# Patient Record
Sex: MALE | HISPANIC OR LATINO | Employment: FULL TIME | URBAN - METROPOLITAN AREA
[De-identification: names, ages, dates, MRNs, and addresses within clinical notes are randomized per-mention and may not be internally consistent; named-entity substitution may affect disease eponyms.]

---

## 2017-09-29 ENCOUNTER — OFFICE VISIT (OUTPATIENT)
Dept: MEDICAL GROUP | Facility: PHYSICIAN GROUP | Age: 22
End: 2017-09-29
Payer: COMMERCIAL

## 2017-09-29 VITALS
OXYGEN SATURATION: 100 % | RESPIRATION RATE: 14 BRPM | SYSTOLIC BLOOD PRESSURE: 140 MMHG | BODY MASS INDEX: 23.58 KG/M2 | HEIGHT: 68 IN | TEMPERATURE: 99.3 F | HEART RATE: 92 BPM | DIASTOLIC BLOOD PRESSURE: 82 MMHG | WEIGHT: 155.6 LBS

## 2017-09-29 DIAGNOSIS — Z13.83 SCREENING FOR CARDIOVASCULAR, RESPIRATORY, AND GENITOURINARY DISEASES: ICD-10-CM

## 2017-09-29 DIAGNOSIS — Z00.00 HEALTHY ADULT ON ROUTINE PHYSICAL EXAMINATION: ICD-10-CM

## 2017-09-29 DIAGNOSIS — Z13.89 SCREENING FOR CARDIOVASCULAR, RESPIRATORY, AND GENITOURINARY DISEASES: ICD-10-CM

## 2017-09-29 DIAGNOSIS — Z13.6 SCREENING FOR CARDIOVASCULAR, RESPIRATORY, AND GENITOURINARY DISEASES: ICD-10-CM

## 2017-09-29 DIAGNOSIS — F17.200 CURRENT SMOKER: ICD-10-CM

## 2017-09-29 PROCEDURE — 99385 PREV VISIT NEW AGE 18-39: CPT | Performed by: NURSE PRACTITIONER

## 2017-09-29 ASSESSMENT — PATIENT HEALTH QUESTIONNAIRE - PHQ9: CLINICAL INTERPRETATION OF PHQ2 SCORE: 0

## 2017-09-30 NOTE — PROGRESS NOTES
Chief Complaint   Patient presents with   • Establish Care       HPI:    Corbin Quintanilla is a 22 y.o. male here to establish care and to discuss the following:    Healthy adult on routine physical examination  Patient is here to establish care and currently has no medical concerns.    Current smoker  violeta smokes approximately 3 cigarettes a day      Current medicines (including changes today)  No current outpatient prescriptions on file.     No current facility-administered medications for this visit.      He  has no past medical history on file.  He  has no past surgical history on file.  Social History   Substance Use Topics   • Smoking status: Current Every Day Smoker     Packs/day: 0.25   • Smokeless tobacco: Never Used   • Alcohol use No     Social History     Social History Narrative   • No narrative on file     Family History   Problem Relation Age of Onset   • Hypertension Mother    • Diabetes Mother    • Diabetes Father    • Hypertension Sister    • Hypertension Brother    • Cancer Maternal Grandmother      lung   • Hypertension Sister      Family Status   Relation Status   • Mother Alive   • Father Alive   • Sister Alive   • Brother Alive   • Maternal Grandmother    • Maternal Grandfather    • Paternal Grandmother    • Paternal Grandfather Alive   • Sister          ROS    Review of Systems   Constitutional: Negative for fever, chills, weight loss and malaise/fatigue.   HENT: Negative for ear pain, nosebleeds, congestion, sore throat and neck pain.    Eyes: Negative for blurred vision.   Respiratory: Negative for cough, sputum production, shortness of breath and wheezing.    Cardiovascular: Negative for chest pain, palpitations,  and leg swelling.   Gastrointestinal: Negative for heartburn, nausea, vomiting, diarrhea and abdominal pain.   Genitourinary: Negative for dysuria, urgency and frequency.   Musculoskeletal: Negative for myalgias, back pain and joint pain.   Skin:  "Negative for rash and itching.   Neurological: Negative for dizziness, tingling, tremors, sensory change, focal weakness and headaches.   Endo/Heme/Allergies: Does not bruise/bleed easily.   Psychiatric/Behavioral: Negative for depression, anxiety, suicidal ideas, insomnia and memory loss.     All other systems reviewed and are negative except as in HPI.     Objective:     Blood pressure 140/82, pulse 92, temperature 37.4 °C (99.3 °F), resp. rate 14, height 1.727 m (5' 8\"), weight 70.6 kg (155 lb 9.6 oz), SpO2 100 %. Body mass index is 23.66 kg/m².  Physical Exam:  Constitutional: Alert, no distress.  Skin: Warm, dry, good turgor, no rashes in visible areas.  Eye: Equal, round and reactive, conjunctiva clear, lids normal.  ENMT: Lips without lesions, good dentition, oropharynx clear. Ear canals are clear, TMs within normal limits bilaterally.   Neck: Trachea midline, no masses, no thyromegaly. No cervical or supraclavicular lymphadenopathy.  Respiratory: Unlabored respiratory effort, lungs clear to auscultation, no wheezes, no ronchi.  Cardiovascular: Normal S1, S2, no murmur, no edema.  Abdomen: Soft, non-tender, no masses, no hepatosplenomegaly.  Psych: Alert and oriented x3, normal affect and mood.  MS: Ambulates independently with steady gait. Has full range of motion of all extremities and spine.  Neuro: Cranial nerves intact. DTRs within normal limits. No neurological deficits.        Assessment and Plan:   The following treatment plan was discussed   1. Healthy adult on routine physical examination     2. Screening for cardiovascular, respiratory, and genitourinary diseases  COMP METABOLIC PANEL    LIPID PROFILE    labs ordered   3. Current smoker      discussed smoking cessation   no previous PCP  Followup: Return in about 1 year (around 9/29/2018) for AWV/HRA.   Please note that this dictation was created using voice recognition software. I have made every reasonable attempt to correct obvious errors, but I " expect that there are errors of grammar and possibly content that I did not discover before finalizing the note.

## 2017-10-05 PROBLEM — F17.200 CURRENT SMOKER: Status: ACTIVE | Noted: 2017-10-05

## 2018-10-19 ENCOUNTER — APPOINTMENT (OUTPATIENT)
Dept: RADIOLOGY | Facility: MEDICAL CENTER | Age: 23
End: 2018-10-19
Attending: EMERGENCY MEDICINE
Payer: COMMERCIAL

## 2018-10-19 ENCOUNTER — HOSPITAL ENCOUNTER (OUTPATIENT)
Facility: MEDICAL CENTER | Age: 23
End: 2018-10-23
Attending: EMERGENCY MEDICINE | Admitting: SURGERY
Payer: COMMERCIAL

## 2018-10-19 DIAGNOSIS — G89.18 POST-OP PAIN: ICD-10-CM

## 2018-10-19 LAB
ALBUMIN SERPL BCP-MCNC: 4.7 G/DL (ref 3.2–4.9)
ALBUMIN/GLOB SERPL: 1.7 G/DL
ALP SERPL-CCNC: 58 U/L (ref 30–99)
ALT SERPL-CCNC: 15 U/L (ref 2–50)
ANION GAP SERPL CALC-SCNC: 9 MMOL/L (ref 0–11.9)
AST SERPL-CCNC: 19 U/L (ref 12–45)
BASOPHILS # BLD AUTO: 0.9 % (ref 0–1.8)
BASOPHILS # BLD: 0.1 K/UL (ref 0–0.12)
BILIRUB SERPL-MCNC: 0.8 MG/DL (ref 0.1–1.5)
BUN SERPL-MCNC: 17 MG/DL (ref 8–22)
CALCIUM SERPL-MCNC: 9.9 MG/DL (ref 8.5–10.5)
CHLORIDE SERPL-SCNC: 106 MMOL/L (ref 96–112)
CO2 SERPL-SCNC: 24 MMOL/L (ref 20–33)
CREAT SERPL-MCNC: 0.77 MG/DL (ref 0.5–1.4)
EKG IMPRESSION: NORMAL
EOSINOPHIL # BLD AUTO: 0.31 K/UL (ref 0–0.51)
EOSINOPHIL NFR BLD: 2.8 % (ref 0–6.9)
ERYTHROCYTE [DISTWIDTH] IN BLOOD BY AUTOMATED COUNT: 39.1 FL (ref 35.9–50)
GLOBULIN SER CALC-MCNC: 2.7 G/DL (ref 1.9–3.5)
GLUCOSE SERPL-MCNC: 122 MG/DL (ref 65–99)
HCT VFR BLD AUTO: 49.1 % (ref 42–52)
HGB BLD-MCNC: 16.5 G/DL (ref 14–18)
IMM GRANULOCYTES # BLD AUTO: 0.05 K/UL (ref 0–0.11)
IMM GRANULOCYTES NFR BLD AUTO: 0.5 % (ref 0–0.9)
LIPASE SERPL-CCNC: 28 U/L (ref 11–82)
LYMPHOCYTES # BLD AUTO: 3.97 K/UL (ref 1–4.8)
LYMPHOCYTES NFR BLD: 36.1 % (ref 22–41)
MCH RBC QN AUTO: 28.6 PG (ref 27–33)
MCHC RBC AUTO-ENTMCNC: 33.6 G/DL (ref 33.7–35.3)
MCV RBC AUTO: 85.2 FL (ref 81.4–97.8)
MONOCYTES # BLD AUTO: 0.71 K/UL (ref 0–0.85)
MONOCYTES NFR BLD AUTO: 6.4 % (ref 0–13.4)
NEUTROPHILS # BLD AUTO: 5.87 K/UL (ref 1.82–7.42)
NEUTROPHILS NFR BLD: 53.3 % (ref 44–72)
NRBC # BLD AUTO: 0 K/UL
NRBC BLD-RTO: 0 /100 WBC
PLATELET # BLD AUTO: 247 K/UL (ref 164–446)
PMV BLD AUTO: 9.9 FL (ref 9–12.9)
POTASSIUM SERPL-SCNC: 3.7 MMOL/L (ref 3.6–5.5)
PROT SERPL-MCNC: 7.4 G/DL (ref 6–8.2)
RBC # BLD AUTO: 5.76 M/UL (ref 4.7–6.1)
SODIUM SERPL-SCNC: 139 MMOL/L (ref 135–145)
TROPONIN I SERPL-MCNC: <0.01 NG/ML (ref 0–0.04)
WBC # BLD AUTO: 11 K/UL (ref 4.8–10.8)

## 2018-10-19 PROCEDURE — 93005 ELECTROCARDIOGRAM TRACING: CPT | Performed by: EMERGENCY MEDICINE

## 2018-10-19 PROCEDURE — 36415 COLL VENOUS BLD VENIPUNCTURE: CPT

## 2018-10-19 PROCEDURE — 700105 HCHG RX REV CODE 258: Performed by: EMERGENCY MEDICINE

## 2018-10-19 PROCEDURE — 700102 HCHG RX REV CODE 250 W/ 637 OVERRIDE(OP): Performed by: EMERGENCY MEDICINE

## 2018-10-19 PROCEDURE — 76705 ECHO EXAM OF ABDOMEN: CPT

## 2018-10-19 PROCEDURE — 83690 ASSAY OF LIPASE: CPT

## 2018-10-19 PROCEDURE — 700111 HCHG RX REV CODE 636 W/ 250 OVERRIDE (IP): Performed by: EMERGENCY MEDICINE

## 2018-10-19 PROCEDURE — 84484 ASSAY OF TROPONIN QUANT: CPT

## 2018-10-19 PROCEDURE — 96365 THER/PROPH/DIAG IV INF INIT: CPT

## 2018-10-19 PROCEDURE — 93005 ELECTROCARDIOGRAM TRACING: CPT

## 2018-10-19 PROCEDURE — 99285 EMERGENCY DEPT VISIT HI MDM: CPT

## 2018-10-19 PROCEDURE — 85025 COMPLETE CBC W/AUTO DIFF WBC: CPT

## 2018-10-19 PROCEDURE — G0378 HOSPITAL OBSERVATION PER HR: HCPCS

## 2018-10-19 PROCEDURE — 80053 COMPREHEN METABOLIC PANEL: CPT

## 2018-10-19 PROCEDURE — 74177 CT ABD & PELVIS W/CONTRAST: CPT

## 2018-10-19 PROCEDURE — 700117 HCHG RX CONTRAST REV CODE 255: Performed by: EMERGENCY MEDICINE

## 2018-10-19 PROCEDURE — A9270 NON-COVERED ITEM OR SERVICE: HCPCS | Performed by: EMERGENCY MEDICINE

## 2018-10-19 PROCEDURE — 96375 TX/PRO/DX INJ NEW DRUG ADDON: CPT

## 2018-10-19 RX ORDER — ONDANSETRON 2 MG/ML
4 INJECTION INTRAMUSCULAR; INTRAVENOUS EVERY 6 HOURS PRN
Status: DISCONTINUED | OUTPATIENT
Start: 2018-10-19 | End: 2018-10-20

## 2018-10-19 RX ORDER — SODIUM CHLORIDE, SODIUM LACTATE, POTASSIUM CHLORIDE, CALCIUM CHLORIDE 600; 310; 30; 20 MG/100ML; MG/100ML; MG/100ML; MG/100ML
30 INJECTION, SOLUTION INTRAVENOUS ONCE
Status: COMPLETED | OUTPATIENT
Start: 2018-10-19 | End: 2018-10-20

## 2018-10-19 RX ORDER — SODIUM CHLORIDE, SODIUM LACTATE, POTASSIUM CHLORIDE, CALCIUM CHLORIDE 600; 310; 30; 20 MG/100ML; MG/100ML; MG/100ML; MG/100ML
INJECTION, SOLUTION INTRAVENOUS CONTINUOUS
Status: DISCONTINUED | OUTPATIENT
Start: 2018-10-19 | End: 2018-10-19

## 2018-10-19 RX ORDER — DEXTROSE MONOHYDRATE, SODIUM CHLORIDE, AND POTASSIUM CHLORIDE 50; 2.98; 4.5 G/1000ML; G/1000ML; G/1000ML
INJECTION, SOLUTION INTRAVENOUS CONTINUOUS
Status: DISCONTINUED | OUTPATIENT
Start: 2018-10-19 | End: 2018-10-20

## 2018-10-19 RX ORDER — MORPHINE SULFATE 4 MG/ML
4 INJECTION, SOLUTION INTRAMUSCULAR; INTRAVENOUS ONCE
Status: COMPLETED | OUTPATIENT
Start: 2018-10-19 | End: 2018-10-19

## 2018-10-19 RX ORDER — MORPHINE SULFATE 4 MG/ML
4 INJECTION, SOLUTION INTRAMUSCULAR; INTRAVENOUS
Status: DISCONTINUED | OUTPATIENT
Start: 2018-10-19 | End: 2018-10-20

## 2018-10-19 RX ORDER — ONDANSETRON 2 MG/ML
4 INJECTION INTRAMUSCULAR; INTRAVENOUS ONCE
Status: COMPLETED | OUTPATIENT
Start: 2018-10-19 | End: 2018-10-19

## 2018-10-19 RX ADMIN — ONDANSETRON 4 MG: 2 INJECTION INTRAMUSCULAR; INTRAVENOUS at 21:06

## 2018-10-19 RX ADMIN — PIPERACILLIN AND TAZOBACTAM 4.5 G: 4; .5 INJECTION, POWDER, LYOPHILIZED, FOR SOLUTION INTRAVENOUS; PARENTERAL at 23:30

## 2018-10-19 RX ADMIN — LIDOCAINE HYDROCHLORIDE 30 ML: 20 SOLUTION OROPHARYNGEAL at 20:51

## 2018-10-19 RX ADMIN — FENTANYL CITRATE 100 MCG: 50 INJECTION, SOLUTION INTRAMUSCULAR; INTRAVENOUS at 21:51

## 2018-10-19 RX ADMIN — IOHEXOL 80 ML: 350 INJECTION, SOLUTION INTRAVENOUS at 22:30

## 2018-10-19 RX ADMIN — MORPHINE SULFATE 4 MG: 4 INJECTION INTRAVENOUS at 21:05

## 2018-10-19 ASSESSMENT — PAIN DESCRIPTION - DESCRIPTORS: DESCRIPTORS: SHARP

## 2018-10-19 ASSESSMENT — PAIN SCALES - GENERAL
PAINLEVEL_OUTOF10: 10
PAINLEVEL_OUTOF10: 10

## 2018-10-20 ENCOUNTER — APPOINTMENT (OUTPATIENT)
Dept: RADIOLOGY | Facility: MEDICAL CENTER | Age: 23
End: 2018-10-20
Attending: SURGERY
Payer: COMMERCIAL

## 2018-10-20 LAB
APTT PPP: 30.1 SEC (ref 24.7–36)
HBV SURFACE AG SER QL: NEGATIVE
HCV AB SER QL: NEGATIVE
HIV 1+2 AB+HIV1 P24 AG SERPL QL IA: NON REACTIVE
INR PPP: 1.28 (ref 0.87–1.13)
LACTATE BLD-SCNC: 1.2 MMOL/L (ref 0.5–2)
PROTHROMBIN TIME: 16.1 SEC (ref 12–14.6)

## 2018-10-20 PROCEDURE — C9113 INJ PANTOPRAZOLE SODIUM, VIA: HCPCS | Performed by: SURGERY

## 2018-10-20 PROCEDURE — 85730 THROMBOPLASTIN TIME PARTIAL: CPT

## 2018-10-20 PROCEDURE — 85610 PROTHROMBIN TIME: CPT

## 2018-10-20 PROCEDURE — 700111 HCHG RX REV CODE 636 W/ 250 OVERRIDE (IP): Performed by: SURGERY

## 2018-10-20 PROCEDURE — G0378 HOSPITAL OBSERVATION PER HR: HCPCS

## 2018-10-20 PROCEDURE — 700101 HCHG RX REV CODE 250: Performed by: EMERGENCY MEDICINE

## 2018-10-20 PROCEDURE — 96375 TX/PRO/DX INJ NEW DRUG ADDON: CPT

## 2018-10-20 PROCEDURE — 700111 HCHG RX REV CODE 636 W/ 250 OVERRIDE (IP)

## 2018-10-20 PROCEDURE — 500002 HCHG ADHESIVE, DERMABOND: Performed by: SURGERY

## 2018-10-20 PROCEDURE — 501571 HCHG TROCAR, SEPARATOR 12X100: Performed by: SURGERY

## 2018-10-20 PROCEDURE — 160039 HCHG SURGERY MINUTES - EA ADDL 1 MIN LEVEL 3: Performed by: SURGERY

## 2018-10-20 PROCEDURE — 700105 HCHG RX REV CODE 258: Performed by: SURGERY

## 2018-10-20 PROCEDURE — 36415 COLL VENOUS BLD VENIPUNCTURE: CPT

## 2018-10-20 PROCEDURE — 500389 HCHG DRAIN, RESERVOIR SUCT JP 100CC: Performed by: SURGERY

## 2018-10-20 PROCEDURE — A9270 NON-COVERED ITEM OR SERVICE: HCPCS | Performed by: SURGERY

## 2018-10-20 PROCEDURE — 501399 HCHG SPECIMAN BAG, ENDO CATC: Performed by: SURGERY

## 2018-10-20 PROCEDURE — 160002 HCHG RECOVERY MINUTES (STAT): Performed by: SURGERY

## 2018-10-20 PROCEDURE — 160048 HCHG OR STATISTICAL LEVEL 1-5: Performed by: SURGERY

## 2018-10-20 PROCEDURE — 501838 HCHG SUTURE GENERAL: Performed by: SURGERY

## 2018-10-20 PROCEDURE — 700101 HCHG RX REV CODE 250

## 2018-10-20 PROCEDURE — 160035 HCHG PACU - 1ST 60 MINS PHASE I: Performed by: SURGERY

## 2018-10-20 PROCEDURE — 501570 HCHG TROCAR, SEPARATOR: Performed by: SURGERY

## 2018-10-20 PROCEDURE — 160028 HCHG SURGERY MINUTES - 1ST 30 MINS LEVEL 3: Performed by: SURGERY

## 2018-10-20 PROCEDURE — 700102 HCHG RX REV CODE 250 W/ 637 OVERRIDE(OP): Performed by: SURGERY

## 2018-10-20 PROCEDURE — 501583 HCHG TROCAR, THRD CAN&SEAL 5X100: Performed by: SURGERY

## 2018-10-20 PROCEDURE — 160009 HCHG ANES TIME/MIN: Performed by: SURGERY

## 2018-10-20 PROCEDURE — 96366 THER/PROPH/DIAG IV INF ADDON: CPT

## 2018-10-20 PROCEDURE — 160036 HCHG PACU - EA ADDL 30 MINS PHASE I: Performed by: SURGERY

## 2018-10-20 PROCEDURE — 502571 HCHG PACK, LAP CHOLE: Performed by: SURGERY

## 2018-10-20 PROCEDURE — 700105 HCHG RX REV CODE 258: Performed by: EMERGENCY MEDICINE

## 2018-10-20 PROCEDURE — 700105 HCHG RX REV CODE 258

## 2018-10-20 PROCEDURE — 83605 ASSAY OF LACTIC ACID: CPT

## 2018-10-20 RX ORDER — SODIUM CHLORIDE, SODIUM LACTATE, POTASSIUM CHLORIDE, CALCIUM CHLORIDE 600; 310; 30; 20 MG/100ML; MG/100ML; MG/100ML; MG/100ML
INJECTION, SOLUTION INTRAVENOUS CONTINUOUS
Status: DISCONTINUED | OUTPATIENT
Start: 2018-10-20 | End: 2018-10-21

## 2018-10-20 RX ORDER — MORPHINE SULFATE 10 MG/ML
4 INJECTION, SOLUTION INTRAMUSCULAR; INTRAVENOUS
Status: DISCONTINUED | OUTPATIENT
Start: 2018-10-20 | End: 2018-10-23 | Stop reason: HOSPADM

## 2018-10-20 RX ORDER — PANTOPRAZOLE SODIUM 40 MG/10ML
40 INJECTION, POWDER, LYOPHILIZED, FOR SOLUTION INTRAVENOUS DAILY
Status: DISCONTINUED | OUTPATIENT
Start: 2018-10-20 | End: 2018-10-22

## 2018-10-20 RX ORDER — BUPIVACAINE HYDROCHLORIDE AND EPINEPHRINE 5; 5 MG/ML; UG/ML
INJECTION, SOLUTION EPIDURAL; INTRACAUDAL; PERINEURAL
Status: DISCONTINUED | OUTPATIENT
Start: 2018-10-20 | End: 2018-10-20 | Stop reason: HOSPADM

## 2018-10-20 RX ORDER — HALOPERIDOL 5 MG/ML
1 INJECTION INTRAMUSCULAR
Status: DISCONTINUED | OUTPATIENT
Start: 2018-10-20 | End: 2018-10-20 | Stop reason: HOSPADM

## 2018-10-20 RX ORDER — KETOROLAC TROMETHAMINE 30 MG/ML
30 INJECTION, SOLUTION INTRAMUSCULAR; INTRAVENOUS EVERY 6 HOURS
Status: DISCONTINUED | OUTPATIENT
Start: 2018-10-20 | End: 2018-10-20

## 2018-10-20 RX ORDER — MIDAZOLAM HYDROCHLORIDE 1 MG/ML
1 INJECTION INTRAMUSCULAR; INTRAVENOUS
Status: DISCONTINUED | OUTPATIENT
Start: 2018-10-20 | End: 2018-10-20 | Stop reason: HOSPADM

## 2018-10-20 RX ORDER — OXYCODONE HYDROCHLORIDE 5 MG/1
5 TABLET ORAL
Status: DISCONTINUED | OUTPATIENT
Start: 2018-10-20 | End: 2018-10-23 | Stop reason: HOSPADM

## 2018-10-20 RX ORDER — HYDROMORPHONE HYDROCHLORIDE 2 MG/ML
0.1 INJECTION, SOLUTION INTRAMUSCULAR; INTRAVENOUS; SUBCUTANEOUS
Status: DISCONTINUED | OUTPATIENT
Start: 2018-10-20 | End: 2018-10-20 | Stop reason: HOSPADM

## 2018-10-20 RX ORDER — ONDANSETRON 2 MG/ML
4 INJECTION INTRAMUSCULAR; INTRAVENOUS EVERY 4 HOURS PRN
Status: DISCONTINUED | OUTPATIENT
Start: 2018-10-20 | End: 2018-10-23 | Stop reason: HOSPADM

## 2018-10-20 RX ORDER — ACETAMINOPHEN 500 MG
1000 TABLET ORAL EVERY 6 HOURS
Status: DISCONTINUED | OUTPATIENT
Start: 2018-10-20 | End: 2018-10-23 | Stop reason: HOSPADM

## 2018-10-20 RX ORDER — HYDROMORPHONE HYDROCHLORIDE 2 MG/ML
0.2 INJECTION, SOLUTION INTRAMUSCULAR; INTRAVENOUS; SUBCUTANEOUS
Status: DISCONTINUED | OUTPATIENT
Start: 2018-10-20 | End: 2018-10-20 | Stop reason: HOSPADM

## 2018-10-20 RX ORDER — SODIUM CHLORIDE, SODIUM LACTATE, POTASSIUM CHLORIDE, CALCIUM CHLORIDE 600; 310; 30; 20 MG/100ML; MG/100ML; MG/100ML; MG/100ML
INJECTION, SOLUTION INTRAVENOUS
Status: COMPLETED
Start: 2018-10-20 | End: 2018-10-20

## 2018-10-20 RX ORDER — SODIUM CHLORIDE, SODIUM LACTATE, POTASSIUM CHLORIDE, CALCIUM CHLORIDE 600; 310; 30; 20 MG/100ML; MG/100ML; MG/100ML; MG/100ML
INJECTION, SOLUTION INTRAVENOUS CONTINUOUS
Status: DISCONTINUED | OUTPATIENT
Start: 2018-10-20 | End: 2018-10-20 | Stop reason: HOSPADM

## 2018-10-20 RX ORDER — DIPHENHYDRAMINE HYDROCHLORIDE 50 MG/ML
25 INJECTION INTRAMUSCULAR; INTRAVENOUS EVERY 6 HOURS PRN
Status: DISCONTINUED | OUTPATIENT
Start: 2018-10-20 | End: 2018-10-23 | Stop reason: HOSPADM

## 2018-10-20 RX ORDER — MAGNESIUM HYDROXIDE 1200 MG/15ML
LIQUID ORAL
Status: COMPLETED | OUTPATIENT
Start: 2018-10-20 | End: 2018-10-20

## 2018-10-20 RX ORDER — OXYCODONE HYDROCHLORIDE 10 MG/1
10 TABLET ORAL
Status: DISCONTINUED | OUTPATIENT
Start: 2018-10-20 | End: 2018-10-23 | Stop reason: HOSPADM

## 2018-10-20 RX ORDER — HYDROMORPHONE HYDROCHLORIDE 2 MG/ML
0.4 INJECTION, SOLUTION INTRAMUSCULAR; INTRAVENOUS; SUBCUTANEOUS
Status: DISCONTINUED | OUTPATIENT
Start: 2018-10-20 | End: 2018-10-20 | Stop reason: HOSPADM

## 2018-10-20 RX ADMIN — PIPERACILLIN SODIUM AND TAZOBACTAM SODIUM 3.38 G: 3; .375 INJECTION, POWDER, FOR SOLUTION INTRAVENOUS at 20:25

## 2018-10-20 RX ADMIN — ACETAMINOPHEN 1000 MG: 500 TABLET, FILM COATED ORAL at 23:55

## 2018-10-20 RX ADMIN — ACETAMINOPHEN 1000 MG: 500 TABLET, FILM COATED ORAL at 12:29

## 2018-10-20 RX ADMIN — SODIUM CHLORIDE, SODIUM LACTATE, POTASSIUM CHLORIDE, CALCIUM CHLORIDE: 600; 310; 30; 20 INJECTION, SOLUTION INTRAVENOUS at 11:15

## 2018-10-20 RX ADMIN — PANTOPRAZOLE SODIUM 40 MG: 40 INJECTION, POWDER, FOR SOLUTION INTRAVENOUS at 13:07

## 2018-10-20 RX ADMIN — POTASSIUM CHLORIDE, DEXTROSE MONOHYDRATE AND SODIUM CHLORIDE: 300; 5; 450 INJECTION, SOLUTION INTRAVENOUS at 03:01

## 2018-10-20 RX ADMIN — ACETAMINOPHEN 1000 MG: 500 TABLET, FILM COATED ORAL at 17:07

## 2018-10-20 RX ADMIN — PIPERACILLIN AND TAZOBACTAM 3.38 G: 3; .375 INJECTION, POWDER, LYOPHILIZED, FOR SOLUTION INTRAVENOUS; PARENTERAL at 03:01

## 2018-10-20 RX ADMIN — SODIUM CHLORIDE, POTASSIUM CHLORIDE, SODIUM LACTATE AND CALCIUM CHLORIDE 2040 ML: 600; 310; 30; 20 INJECTION, SOLUTION INTRAVENOUS at 00:39

## 2018-10-20 RX ADMIN — SODIUM CHLORIDE, POTASSIUM CHLORIDE, SODIUM LACTATE AND CALCIUM CHLORIDE: 600; 310; 30; 20 INJECTION, SOLUTION INTRAVENOUS at 10:55

## 2018-10-20 RX ADMIN — PIPERACILLIN SODIUM AND TAZOBACTAM SODIUM 3.38 G: 3; .375 INJECTION, POWDER, FOR SOLUTION INTRAVENOUS at 12:29

## 2018-10-20 RX ADMIN — SODIUM CHLORIDE, POTASSIUM CHLORIDE, SODIUM LACTATE AND CALCIUM CHLORIDE 1000 ML: 600; 310; 30; 20 INJECTION, SOLUTION INTRAVENOUS at 01:41

## 2018-10-20 ASSESSMENT — COPD QUESTIONNAIRES
COPD SCREENING SCORE: 2
DO YOU EVER COUGH UP ANY MUCUS OR PHLEGM?: NO/ONLY WITH OCCASIONAL COLDS OR INFECTIONS
DURING THE PAST 4 WEEKS HOW MUCH DID YOU FEEL SHORT OF BREATH: NONE/LITTLE OF THE TIME
IN THE PAST 12 MONTHS DO YOU DO LESS THAN YOU USED TO BECAUSE OF YOUR BREATHING PROBLEMS: DISAGREE/UNSURE
HAVE YOU SMOKED AT LEAST 100 CIGARETTES IN YOUR ENTIRE LIFE: YES

## 2018-10-20 ASSESSMENT — COGNITIVE AND FUNCTIONAL STATUS - GENERAL
WALKING IN HOSPITAL ROOM: A LITTLE
CLIMB 3 TO 5 STEPS WITH RAILING: A LITTLE
STANDING UP FROM CHAIR USING ARMS: A LITTLE
SUGGESTED CMS G CODE MODIFIER MOBILITY: CK
DRESSING REGULAR UPPER BODY CLOTHING: A LITTLE
SUGGESTED CMS G CODE MODIFIER DAILY ACTIVITY: CJ
DAILY ACTIVITIY SCORE: 20
MOVING FROM LYING ON BACK TO SITTING ON SIDE OF FLAT BED: A LITTLE
TOILETING: A LITTLE
DRESSING REGULAR LOWER BODY CLOTHING: A LITTLE
MOVING TO AND FROM BED TO CHAIR: A LITTLE
TURNING FROM BACK TO SIDE WHILE IN FLAT BAD: A LITTLE
HELP NEEDED FOR BATHING: A LITTLE
MOBILITY SCORE: 18

## 2018-10-20 ASSESSMENT — PAIN SCALES - GENERAL
PAINLEVEL_OUTOF10: 0

## 2018-10-20 ASSESSMENT — PATIENT HEALTH QUESTIONNAIRE - PHQ9
1. LITTLE INTEREST OR PLEASURE IN DOING THINGS: NOT AT ALL
1. LITTLE INTEREST OR PLEASURE IN DOING THINGS: NOT AT ALL
SUM OF ALL RESPONSES TO PHQ9 QUESTIONS 1 AND 2: 0
SUM OF ALL RESPONSES TO PHQ9 QUESTIONS 1 AND 2: 0
2. FEELING DOWN, DEPRESSED, IRRITABLE, OR HOPELESS: NOT AT ALL
2. FEELING DOWN, DEPRESSED, IRRITABLE, OR HOPELESS: NOT AT ALL

## 2018-10-20 ASSESSMENT — LIFESTYLE VARIABLES
ALCOHOL_USE: NO
EVER_SMOKED: YES

## 2018-10-20 NOTE — OR NURSING
Patient A+ox4. Denies pain or nausea. abd lap stbs x5  With dermabond closure intact. Ice pack to sites.  Ngt to low suction.  Dr Francois at bedside ordered patient Zosyn dose be given in pacu as previously ordered

## 2018-10-20 NOTE — PROGRESS NOTES
Patient arrived to unit via transport. Patient on 2L oxygen. Able to ambulate from gurney to bed with stand by assist. Sister at bedside.Report received from ER RN, Dong. Bag 1 of LR bolus started per ER RN and hanging when up to floor. POC discussed.

## 2018-10-20 NOTE — H&P
10/20  ATSP by Dr Lee for Appendicitis    HPI: 23y M here with abdominal pain, diagnosed with appendicitis and admitted overnight.  He did not have any pain until yesterday evening when he developed sudden onset epigastric pain which migrated to the RLQ.  This was accompanied by nausea and he has never had similar pain before.      PMHx: none    PSHx: none    Meds: see Med Rec, no anticoagulation    NKDA    FamHx: no colon/rectal cancers, no other pertinent family history    SocHx: No Drugs, occasional EtOH, smokes 1/4ppd       ROS: negative except as above    Consitutional- above  HEENT- no visual changes, no sneezing or runny nose  Skin- no rashes or itching  Cardiovascular- no chest pain or palpatations  Respiratory- no SOB or cough  GI- above  - no dysurea  Neuro- no weakness or syncope  Musculoskeletal- no muscle or joint pain  Heme- no bleeding or bruising  Lymphatic- no enlarged nodes or previous splenectomy  Endocrine- No sweating or heat/cold intolerance  Allergy- No asthma or hives  Psychiatric- no depression or anxiety        Physical Exam:   AFVSS  A@O x3, NAD  NCAT, no scleral icterus  Neck nontender, no lymphadenopathy  Normal respiratory effort, no chest wall masses  RRR, 2+ pulses  Abdomen soft, no peritonitis, no masses, tender to palpation in RLQ at McBurney's point  Extremities warm and well perfused  No skin rashes or lesions    Labs: WBC 11.0, hct 49, Plt 247  Lytes wnl, LFTs wnl  PT 16.1, INR 1.28, PTT 30.1  Troponin negative    Radiology: CT A/P: 1.  Nonvisualization of the appendix, there is scattered free fluid within the abdomen and small foci of free air seen within the portal hepatis, appearance most compatible with viscus perforation, appearance concerning for ruptured appendicitis.  2.  Hepatomegaly    A/P: 23y M here with Appendicitis, clinically stable but some suggestion of early perforation.  Will proceed to OR for Lap Appy this am, pt is NPO and consents to planned operation.

## 2018-10-20 NOTE — OR SURGEON
Immediate Post OP Note    PreOp Diagnosis: Appendicitis    PostOp Diagnosis: Perforated Gastric Ulcer    Procedure(s):  LAPAROSCOPIC SALOMÓN PATCH - Wound Class: Clean Contaminated    Surgeon(s):  Juan Carlos Kwong M.D.    Anesthesiologist/Type of Anesthesia:  Anesthesiologist: Fabián Henderson M.D./General    Surgical Staff:  Assistant: MICHELLE Ly  Circulator: Shi Olvera RROM; Isabela Joy R.N.  Scrub Person: Xiang Melchor; Leno Rubin    Specimens removed if any:  * No specimens in log *    Estimated Blood Loss: 5cc    Findings: Perforated Ulcer on the gastric antrum.  Contaminated fluid in abdomen washed out.  Repaired with Salomón Patch of Omentum with hemostasis.      Complications: none        10/20/2018 10:39 AM Juan Carlos Kwong M.D.

## 2018-10-20 NOTE — PROGRESS NOTES
Pt A&Ox4.  Denies pain this AM.  Strict NPO. IV fluids running, IV antibiotics running.   Denies n/v.   Saturating >90% on RA. HR 86. Afebrile.  Pt ambulates SBA, calls appropriately for assistance.  Updated on plan of care. Safety education provided. Bed locked in low. Call light within reach. Rounding in place.

## 2018-10-20 NOTE — PROGRESS NOTES
Back to floor. VSS. Alert, oriented. Denies pain. NG tube in place. ARLYN x1. Lap sites with derma bond, CDI, well approximated. Ice pack in place. Family at bedside.

## 2018-10-20 NOTE — ED TRIAGE NOTES
Chief Complaint   Patient presents with   • Epigastric Pain     Pain right below the xiphoid process       Pt brought in by EMS for epigastric pain while at work. N/V started just before pain onset. Pt had to sit down because he felt lightheaded per EMS.     EMS Vitals:  BP: 130/69  HR: 68  Spo2: 99% on RA     EMS Interventions: 18G IV RAC, 100mcg fentanyl (no relief)    On arrival to ED pt is A&O x 4.     Pt takes: No meds

## 2018-10-20 NOTE — ED PROVIDER NOTES
"ED Provider Note    Scribed for Sharon Pierson M.D. by Brianna Coker. 10/19/2018, 8:43 PM.    Primary care provider: CHRISTY Zepeda  Means of arrival: Ambulance  History obtained from: Patient  History limited by: None    CHIEF COMPLAINT  Chief Complaint   Patient presents with   • Epigastric Pain     Pain right below the xiphoid process       HPI  Corbin Quintanilla is a 23 y.o. male who presents to the Emergency Department for evaluation of epigastric abdominal pain, onset 1 hour while at work. Patient works as a . He denies any postprandial discomfort, vomiting, or fevers. He has never experienced pain like this before. Patient smokes cigarettes. He denies alcohol consumption or other recreational drug use.     REVIEW OF SYSTEMS  Pertinent positives include epigastric abdominal pain. Pertinent negatives include no postprandial discomfort, vomiting, fevers. All other systems reviewed and negative.     PAST MEDICAL HISTORY       SURGICAL HISTORY  patient denies any surgical history    SOCIAL HISTORY  Social History   Substance Use Topics   • Smoking status: Current Every Day Smoker     Packs/day: 0.25     Types: Cigarettes   • Smokeless tobacco: Never Used   • Alcohol use No      History   Drug Use No       FAMILY HISTORY  Family History   Problem Relation Age of Onset   • Hypertension Mother    • Diabetes Mother    • Diabetes Father    • Hypertension Sister    • Hypertension Brother    • Cancer Maternal Grandmother         lung   • Hypertension Sister        CURRENT MEDICATIONS  Home Medications     Reviewed by Dong Messer R.N. (Registered Nurse) on 10/19/18 at 2008  Med List Status: <None>   Medication Last Dose Status        Patient Ethan Taking any Medications                       ALLERGIES  No Known Allergies    PHYSICAL EXAM  VITAL SIGNS: Pulse 67   Temp 36.9 °C (98.4 °F)   Resp 16   Ht 1.727 m (5' 8\")   Wt 68 kg (150 lb)   SpO2 98%   BMI 22.81 kg/m² "     Constitutional:  Uncomfortable appearing, able to answer questions  HENT: Nose is normal in appearance without rhinorrhea, external ears are normal,  moist mucous membranes  Eyes: Anicteric,  pupils are equal round and reactive, there is no conjunctival drainage or pallor   Neck: The trachea is midline, there is no obvious mass or meningeal signs  Cardiovascular: Equal radial pulsation, regular rate and rhythm without murmurs gallops or rubs  Thorax & Lungs: Respiratory rate and effort are normal. There is normal chest excursion with respiration. No wheezes rhonchi or rales noted.  Abdomen: Abdomen is normal in appearance, right lower quadrant tenderness, normal bowel sounds, no pain with cough, nonpulsatile  Skin: Visualized skin is warm, no erythema, no rash.  Neurologic:  Cranial nerves II through XII are intact there is no focal abnormality noted.  Psychiatric: Normal mood and mentation      DIAGNOSTIC STUDIES / PROCEDURES    LABS  Results for orders placed or performed during the hospital encounter of 10/19/18   CBC WITH DIFFERENTIAL   Result Value Ref Range    WBC 11.0 (H) 4.8 - 10.8 K/uL    RBC 5.76 4.70 - 6.10 M/uL    Hemoglobin 16.5 14.0 - 18.0 g/dL    Hematocrit 49.1 42.0 - 52.0 %    MCV 85.2 81.4 - 97.8 fL    MCH 28.6 27.0 - 33.0 pg    MCHC 33.6 (L) 33.7 - 35.3 g/dL    RDW 39.1 35.9 - 50.0 fL    Platelet Count 247 164 - 446 K/uL    MPV 9.9 9.0 - 12.9 fL    Neutrophils-Polys 53.30 44.00 - 72.00 %    Lymphocytes 36.10 22.00 - 41.00 %    Monocytes 6.40 0.00 - 13.40 %    Eosinophils 2.80 0.00 - 6.90 %    Basophils 0.90 0.00 - 1.80 %    Immature Granulocytes 0.50 0.00 - 0.90 %    Nucleated RBC 0.00 /100 WBC    Neutrophils (Absolute) 5.87 1.82 - 7.42 K/uL    Lymphs (Absolute) 3.97 1.00 - 4.80 K/uL    Monos (Absolute) 0.71 0.00 - 0.85 K/uL    Eos (Absolute) 0.31 0.00 - 0.51 K/uL    Baso (Absolute) 0.10 0.00 - 0.12 K/uL    Immature Granulocytes (abs) 0.05 0.00 - 0.11 K/uL    NRBC (Absolute) 0.00 K/uL   COMP  METABOLIC PANEL   Result Value Ref Range    Sodium 139 135 - 145 mmol/L    Potassium 3.7 3.6 - 5.5 mmol/L    Chloride 106 96 - 112 mmol/L    Co2 24 20 - 33 mmol/L    Anion Gap 9.0 0.0 - 11.9    Glucose 122 (H) 65 - 99 mg/dL    Bun 17 8 - 22 mg/dL    Creatinine 0.77 0.50 - 1.40 mg/dL    Calcium 9.9 8.5 - 10.5 mg/dL    AST(SGOT) 19 12 - 45 U/L    ALT(SGPT) 15 2 - 50 U/L    Alkaline Phosphatase 58 30 - 99 U/L    Total Bilirubin 0.8 0.1 - 1.5 mg/dL    Albumin 4.7 3.2 - 4.9 g/dL    Total Protein 7.4 6.0 - 8.2 g/dL    Globulin 2.7 1.9 - 3.5 g/dL    A-G Ratio 1.7 g/dL   LIPASE   Result Value Ref Range    Lipase 28 11 - 82 U/L   TROPONIN   Result Value Ref Range    Troponin I <0.01 0.00 - 0.04 ng/mL   ESTIMATED GFR   Result Value Ref Range    GFR If African American >60 >60 mL/min/1.73 m 2    GFR If Non African American >60 >60 mL/min/1.73 m 2   EKG   Result Value Ref Range    Report       [facility] Emergency Dept.    Test Date:  2018-10-19  Pt Name:    BECK MERAZ              Department: ER  MRN:        [id number]                      Room:       [facility]  Gender:     Male                         Technician: [id number]  :        1995                   Requested By:ER TRIAGE PROTOCOL  Order #:    [id number]                    Reading MD:    Measurements  Intervals                                Axis  Rate:       70                           P:          -42  SD:         132                          QRS:        39  QRSD:       84                           T:          43  QT:         372  QTc:        402    Interpretive Statements  SINUS RHYTHM  ST ELEV, PROBABLE NORMAL EARLY REPOL PATTERN  No previous ECG available for comparison     All labs reviewed by me.    EKG  I interpreted this EKG myself.  This is a 12-lead study.  The rhythm is sinus with a rate of 70.  There are no ST segment nor T wave abnormalities.  Interpretation: No ST segment elevation myocardial infarction    RADIOLOGY  US-RUQ     (Results Pending)   CT-ABDOMEN-PELVIS WITH    (Results Pending)   The radiologist's interpretation of all radiological studies have been reviewed by me.    COURSE & MEDICAL DECISION MAKING  Nursing notes and vital signs were reviewed. (See chart for details)     The patient presents with epigastric abdominal pain, and the differential diagnosis includes but is not limited to cholecystitis, appendicitis, perforated ulcer, hepatitis, pancreatitis.     8:43 PM Initial orders in the Emergency Department included CT A/P, US RUQ, CBC, CMP, Lipase, Troponin, EKG and initial treatment in the Emergency Department included GI Cocktail.  Patient was initially given GI cocktail with no alleviation this was followed by 4 mg of morphine and 4 mg of Toradol    9:53 PM  Continued significant pain in the patient was given 100 mcg of fentanyl    ED testing reveals gallbladder stones, they are lodged in the duct he is exquisitely tender with ultrasound imaging.  CT has been ordered as he has lower abdominal pain as well.  The patient will be evaluated by my partner after imaging results to determine disposition      FINAL IMPRESSION  Cholelithiasis     IBrianna (Tunde), am scribing for, and in the presence of, Sharon Pierson M.D..    Electronically signed by: Brianna Coker (Tunde), 10/19/2018    ISharon M.D. personally performed the services described in this documentation, as scribed by Brianna Coker in my presence, and it is both accurate and complete. C.     The note accurately reflects work and decisions made by me.  Sharon Pierson  10/19/2018  9:54 PM

## 2018-10-20 NOTE — ED PROVIDER NOTES
"ED Provider Note    I assumed care of this patient at shift change from my partner Dr. Pierson.  Patient was pending CT scan of the abdomen pelvis for further evaluation of severe abdominal pain.  CT scan resulted I discussed with radiologist the time result that it showed some minimal free air within the sharmila hepatis and some scattered free fluid some inflammatory changes in the pelvis.  With findings and age concerning for acute ruptured appendicitis.  Patient was given 4.5 g of Zosyn then discussed the case with general surgeon Dr. Kwong and plans were made for operative intervention tomorrow morning we will keep on IV fluids n.p.o. and IV antibiotics through the night tonight.  Region of evaluation patient is of moderate abdominal pain vital signs improved.  Admission orders placed and patient transferred to the floor in guarded condition.      /51   Pulse 78   Temp 36.6 °C (97.8 °F)   Resp 18   Ht 1.727 m (5' 8\")   Wt 70.5 kg (155 lb 6.8 oz)   SpO2 99%   BMI 23.63 kg/m²     Impression:  1.  Abdominal pain  2.  Intraperitoneal free air  3.  Probable ruptured appendicitis    "

## 2018-10-20 NOTE — OR NURSING
Patient cont to deny pain or nausea. Declines meds. Report to Jesica RUIZ.  Family updated with patient plan

## 2018-10-20 NOTE — PROGRESS NOTES
Order for intermittent suction. Unable to find appropriate pump. Spoke with Dr. Kwong. Per MD, okay to connect NG tube to low continuous suction and pause suction occasionally to give pt a break.

## 2018-10-20 NOTE — PROGRESS NOTES
2 RN skin check with SARA Reyna:    No redness or breakdown seen on bony prominences.     Callous on l big toe  Cracked, dry feet bilaterally

## 2018-10-20 NOTE — ED NOTES
Assist RN: US at bedside stating pt is in too much pain for procedure, ERP notified, orders received, pt medicated per MAR.

## 2018-10-20 NOTE — ED NOTES
Med Rec Updated and Complete per Pt at bedside with permission to do so in front of family/visitor  Allergies Reviewed  No PO ABX last 30 days.    Pt denies any RX or OTC medication at this time.

## 2018-10-21 PROCEDURE — 96366 THER/PROPH/DIAG IV INF ADDON: CPT

## 2018-10-21 PROCEDURE — 90471 IMMUNIZATION ADMIN: CPT

## 2018-10-21 PROCEDURE — 700111 HCHG RX REV CODE 636 W/ 250 OVERRIDE (IP): Performed by: SURGERY

## 2018-10-21 PROCEDURE — 700102 HCHG RX REV CODE 250 W/ 637 OVERRIDE(OP): Performed by: SURGERY

## 2018-10-21 PROCEDURE — 700105 HCHG RX REV CODE 258: Performed by: SURGERY

## 2018-10-21 PROCEDURE — G0378 HOSPITAL OBSERVATION PER HR: HCPCS

## 2018-10-21 PROCEDURE — 700101 HCHG RX REV CODE 250: Performed by: SURGERY

## 2018-10-21 PROCEDURE — 90686 IIV4 VACC NO PRSV 0.5 ML IM: CPT | Performed by: SURGERY

## 2018-10-21 PROCEDURE — C9113 INJ PANTOPRAZOLE SODIUM, VIA: HCPCS | Performed by: SURGERY

## 2018-10-21 PROCEDURE — A9270 NON-COVERED ITEM OR SERVICE: HCPCS | Performed by: SURGERY

## 2018-10-21 PROCEDURE — 96376 TX/PRO/DX INJ SAME DRUG ADON: CPT

## 2018-10-21 RX ORDER — DEXTROSE MONOHYDRATE, SODIUM CHLORIDE, AND POTASSIUM CHLORIDE 50; 1.49; 4.5 G/1000ML; G/1000ML; G/1000ML
INJECTION, SOLUTION INTRAVENOUS CONTINUOUS
Status: DISCONTINUED | OUTPATIENT
Start: 2018-10-21 | End: 2018-10-23

## 2018-10-21 RX ADMIN — PIPERACILLIN SODIUM AND TAZOBACTAM SODIUM 3.38 G: 3; .375 INJECTION, POWDER, FOR SOLUTION INTRAVENOUS at 05:01

## 2018-10-21 RX ADMIN — SODIUM CHLORIDE, POTASSIUM CHLORIDE, SODIUM LACTATE AND CALCIUM CHLORIDE: 600; 310; 30; 20 INJECTION, SOLUTION INTRAVENOUS at 06:00

## 2018-10-21 RX ADMIN — POTASSIUM CHLORIDE, DEXTROSE MONOHYDRATE AND SODIUM CHLORIDE: 150; 5; 450 INJECTION, SOLUTION INTRAVENOUS at 13:07

## 2018-10-21 RX ADMIN — ENOXAPARIN SODIUM 40 MG: 40 INJECTION, SOLUTION INTRAVENOUS; SUBCUTANEOUS at 05:01

## 2018-10-21 RX ADMIN — PANTOPRAZOLE SODIUM 40 MG: 40 INJECTION, POWDER, FOR SOLUTION INTRAVENOUS at 05:01

## 2018-10-21 RX ADMIN — PIPERACILLIN SODIUM AND TAZOBACTAM SODIUM 3.38 G: 3; .375 INJECTION, POWDER, FOR SOLUTION INTRAVENOUS at 13:07

## 2018-10-21 RX ADMIN — ACETAMINOPHEN 1000 MG: 500 TABLET, FILM COATED ORAL at 13:07

## 2018-10-21 RX ADMIN — ACETAMINOPHEN 1000 MG: 500 TABLET, FILM COATED ORAL at 05:01

## 2018-10-21 RX ADMIN — PIPERACILLIN SODIUM AND TAZOBACTAM SODIUM 3.38 G: 3; .375 INJECTION, POWDER, FOR SOLUTION INTRAVENOUS at 21:40

## 2018-10-21 RX ADMIN — INFLUENZA A VIRUS A/MICHIGAN/45/2015 X-275 (H1N1) ANTIGEN (FORMALDEHYDE INACTIVATED), INFLUENZA A VIRUS A/SINGAPORE/INFIMH-16-0019/2016 IVR-186 (H3N2) ANTIGEN (FORMALDEHYDE INACTIVATED), INFLUENZA B VIRUS B/PHUKET/3073/2013 ANTIGEN (FORMALDEHYDE INACTIVATED), AND INFLUENZA B VIRUS B/MARYLAND/15/2016 BX-69A ANTIGEN (FORMALDEHYDE INACTIVATED) 0.5 ML: 15; 15; 15; 15 INJECTION, SUSPENSION INTRAMUSCULAR at 05:00

## 2018-10-21 RX ADMIN — OXYCODONE HYDROCHLORIDE 5 MG: 5 TABLET ORAL at 13:07

## 2018-10-21 ASSESSMENT — PATIENT HEALTH QUESTIONNAIRE - PHQ9
2. FEELING DOWN, DEPRESSED, IRRITABLE, OR HOPELESS: NOT AT ALL
SUM OF ALL RESPONSES TO PHQ9 QUESTIONS 1 AND 2: 0
1. LITTLE INTEREST OR PLEASURE IN DOING THINGS: NOT AT ALL

## 2018-10-21 ASSESSMENT — PAIN SCALES - GENERAL
PAINLEVEL_OUTOF10: 2
PAINLEVEL_OUTOF10: 0

## 2018-10-21 NOTE — PROGRESS NOTES
"10/21  S:  23 y.o.male s/p Lap Salomón Patch Repair  POD# 1.  Patient doing well, NGT remains to suction overnight and pt NPO.  He is ambulating and pain is minimal at this point.  No new labs this am    O:  Blood pressure 115/66, pulse 70, temperature 37.1 °C (98.7 °F), resp. rate 18, height 1.727 m (5' 8\"), weight 70.5 kg (155 lb 6.8 oz), SpO2 97 %.  I/O last 3 completed shifts:  In: 4205 [P.O.:80; I.V.:3925]  Out: 1170 [Drains:120]  Recent Labs      10/19/18   2005   SODIUM  139   POTASSIUM  3.7   CHLORIDE  106   CO2  24   GLUCOSE  122*   BUN  17   CREATININE  0.77   CALCIUM  9.9     Recent Labs      10/19/18   2005   WBC  11.0*   RBC  5.76   HEMOGLOBIN  16.5   HEMATOCRIT  49.1   MCV  85.2   MCH  28.6   MCHC  33.6*   RDW  39.1   PLATELETCT  247   MPV  9.9       Alert and Oriented x3, No Acute Distress  Normal Respiratory Effort  Abdomen soft, appropriately tender  Incisions/Bandages clean/dry/intact  Extremities warm and well perfused  ARLYN Output Serosanguinous- 120cc  NGT- 1050cc bilious output    A/P:  Pain control with PO meds, IV prn  Diet NPO, NGT to suction.  Planned UGI films in am with radiology- order placed.  Possible NGT removal after if repair is intact  Fluids continue maintence  Ambulate tid and ad noah  Continue IV Abx and IV PPI for perforated ulcer    "

## 2018-10-21 NOTE — PROGRESS NOTES
Report received from NOC shift RN.   A/O X 4. Room air.   NG tube placed in left nare set to low suction. Ng tube taped in place.   200mL in container since 0400am.   ARLYN drain on LLQ, bulb compressed, scant serous output present.   X 3 Lap incisions approximated with dermabond. NIECY.  Pt denies pain at this time.  BS normoactive X 3.   -BM. -flatus since sx.   +void.   PIV on right FA running LR at 50mL/hr.   Patient ambulating standby assist.

## 2018-10-22 ENCOUNTER — APPOINTMENT (OUTPATIENT)
Dept: RADIOLOGY | Facility: MEDICAL CENTER | Age: 23
End: 2018-10-22
Attending: SURGERY
Payer: COMMERCIAL

## 2018-10-22 LAB
ANION GAP SERPL CALC-SCNC: 11 MMOL/L (ref 0–11.9)
BUN SERPL-MCNC: 13 MG/DL (ref 8–22)
CALCIUM SERPL-MCNC: 9.4 MG/DL (ref 8.5–10.5)
CHLORIDE SERPL-SCNC: 104 MMOL/L (ref 96–112)
CO2 SERPL-SCNC: 25 MMOL/L (ref 20–33)
CREAT SERPL-MCNC: 0.67 MG/DL (ref 0.5–1.4)
ERYTHROCYTE [DISTWIDTH] IN BLOOD BY AUTOMATED COUNT: 38.7 FL (ref 35.9–50)
GLUCOSE BLD-MCNC: 83 MG/DL (ref 65–99)
GLUCOSE SERPL-MCNC: 61 MG/DL (ref 65–99)
HCT VFR BLD AUTO: 45.5 % (ref 42–52)
HGB BLD-MCNC: 15.4 G/DL (ref 14–18)
MAGNESIUM SERPL-MCNC: 1.6 MG/DL (ref 1.5–2.5)
MCH RBC QN AUTO: 28.9 PG (ref 27–33)
MCHC RBC AUTO-ENTMCNC: 33.8 G/DL (ref 33.7–35.3)
MCV RBC AUTO: 85.4 FL (ref 81.4–97.8)
PLATELET # BLD AUTO: 184 K/UL (ref 164–446)
PMV BLD AUTO: 10.1 FL (ref 9–12.9)
POTASSIUM SERPL-SCNC: 3.2 MMOL/L (ref 3.6–5.5)
RBC # BLD AUTO: 5.33 M/UL (ref 4.7–6.1)
SODIUM SERPL-SCNC: 140 MMOL/L (ref 135–145)
WBC # BLD AUTO: 11 K/UL (ref 4.8–10.8)

## 2018-10-22 PROCEDURE — 96366 THER/PROPH/DIAG IV INF ADDON: CPT

## 2018-10-22 PROCEDURE — 80048 BASIC METABOLIC PNL TOTAL CA: CPT

## 2018-10-22 PROCEDURE — 74241 DX-UPPER GI-SERIES WITH KUB: CPT

## 2018-10-22 PROCEDURE — 700105 HCHG RX REV CODE 258

## 2018-10-22 PROCEDURE — 36415 COLL VENOUS BLD VENIPUNCTURE: CPT

## 2018-10-22 PROCEDURE — 96376 TX/PRO/DX INJ SAME DRUG ADON: CPT

## 2018-10-22 PROCEDURE — C9113 INJ PANTOPRAZOLE SODIUM, VIA: HCPCS | Performed by: SURGERY

## 2018-10-22 PROCEDURE — 83735 ASSAY OF MAGNESIUM: CPT

## 2018-10-22 PROCEDURE — A9270 NON-COVERED ITEM OR SERVICE: HCPCS | Performed by: SURGERY

## 2018-10-22 PROCEDURE — 700117 HCHG RX CONTRAST REV CODE 255: Performed by: SURGERY

## 2018-10-22 PROCEDURE — 85027 COMPLETE CBC AUTOMATED: CPT

## 2018-10-22 PROCEDURE — 700105 HCHG RX REV CODE 258: Performed by: SURGERY

## 2018-10-22 PROCEDURE — 700101 HCHG RX REV CODE 250: Performed by: SURGERY

## 2018-10-22 PROCEDURE — G0378 HOSPITAL OBSERVATION PER HR: HCPCS

## 2018-10-22 PROCEDURE — 700102 HCHG RX REV CODE 250 W/ 637 OVERRIDE(OP): Performed by: SURGERY

## 2018-10-22 PROCEDURE — 700111 HCHG RX REV CODE 636 W/ 250 OVERRIDE (IP): Performed by: SURGERY

## 2018-10-22 PROCEDURE — 82962 GLUCOSE BLOOD TEST: CPT

## 2018-10-22 RX ORDER — OMEPRAZOLE 20 MG/1
20 CAPSULE, DELAYED RELEASE ORAL DAILY
Status: DISCONTINUED | OUTPATIENT
Start: 2018-10-23 | End: 2018-10-23 | Stop reason: HOSPADM

## 2018-10-22 RX ORDER — SODIUM CHLORIDE 9 MG/ML
INJECTION, SOLUTION INTRAVENOUS
Status: COMPLETED
Start: 2018-10-22 | End: 2018-10-22

## 2018-10-22 RX ORDER — POTASSIUM CHLORIDE 7.45 MG/ML
10 INJECTION INTRAVENOUS
Status: COMPLETED | OUTPATIENT
Start: 2018-10-22 | End: 2018-10-22

## 2018-10-22 RX ADMIN — ACETAMINOPHEN 1000 MG: 500 TABLET, FILM COATED ORAL at 11:50

## 2018-10-22 RX ADMIN — PIPERACILLIN SODIUM AND TAZOBACTAM SODIUM 3.38 G: 3; .375 INJECTION, POWDER, FOR SOLUTION INTRAVENOUS at 05:41

## 2018-10-22 RX ADMIN — ACETAMINOPHEN 1000 MG: 500 TABLET, FILM COATED ORAL at 05:41

## 2018-10-22 RX ADMIN — SODIUM CHLORIDE: 9 INJECTION, SOLUTION INTRAVENOUS at 21:15

## 2018-10-22 RX ADMIN — PANTOPRAZOLE SODIUM 40 MG: 40 INJECTION, POWDER, FOR SOLUTION INTRAVENOUS at 11:41

## 2018-10-22 RX ADMIN — POTASSIUM CHLORIDE 10 MEQ: 7.46 INJECTION, SOLUTION INTRAVENOUS at 11:00

## 2018-10-22 RX ADMIN — IOHEXOL 160 ML: 300 INJECTION, SOLUTION INTRAVENOUS at 09:34

## 2018-10-22 RX ADMIN — POTASSIUM CHLORIDE, DEXTROSE MONOHYDRATE AND SODIUM CHLORIDE: 150; 5; 450 INJECTION, SOLUTION INTRAVENOUS at 21:11

## 2018-10-22 RX ADMIN — PIPERACILLIN SODIUM AND TAZOBACTAM SODIUM 3.38 G: 3; .375 INJECTION, POWDER, FOR SOLUTION INTRAVENOUS at 12:13

## 2018-10-22 RX ADMIN — PIPERACILLIN SODIUM AND TAZOBACTAM SODIUM 3.38 G: 3; .375 INJECTION, POWDER, FOR SOLUTION INTRAVENOUS at 21:11

## 2018-10-22 RX ADMIN — POTASSIUM CHLORIDE 10 MEQ: 7.46 INJECTION, SOLUTION INTRAVENOUS at 09:57

## 2018-10-22 ASSESSMENT — PAIN SCALES - GENERAL
PAINLEVEL_OUTOF10: 0

## 2018-10-22 NOTE — CARE PLAN
Problem: Infection  Goal: Will remain free from infection  Outcome: PROGRESSING AS EXPECTED  Patient's WBC is 11 and he has been taught to wash hands frequently.     Problem: Bowel/Gastric:  Goal: Normal bowel function is maintained or improved  Outcome: PROGRESSING AS EXPECTED  Patient had a medium sized bowel movement tonight.

## 2018-10-22 NOTE — PROGRESS NOTES
"Upper G series results showed \"no gastric or duodenal leak identified.\" Dr. Kwong notified with results.  OK to remove NG tube and place patient on full liquid diet with crackers and toast.    "

## 2018-10-22 NOTE — CARE PLAN
Problem: Infection  Goal: Will remain free from infection  Outcome: PROGRESSING AS EXPECTED  Patient given IV zosyn per MAR.      Problem: Bowel/Gastric:  Goal: Normal bowel function is maintained or improved  Outcome: PROGRESSING AS EXPECTED  NG tube removed per order and patient started on a full liquid diet.

## 2018-10-22 NOTE — PROGRESS NOTES
Received report from evening RN.  Assumed care of patient.  Patient A&Ox4.  No complaints of pain at this time.  NG tube to intermittent low suction, with moderate amounts of green output.  Abdominal lap stabs, approximated without redness or drainage.  ARLYN drain to RLQ with moderate amounts serosanguinous output.  [place], strength 5/5.  No complaints of numbness/tingling.  No nausea or vomiting.  NPO at this time.  UP to bathroom with steady gait, no assistance required.  +void.  +BM.  Plan of care discussed.  Call light within reach.  Bed in lowest position.

## 2018-10-22 NOTE — PROGRESS NOTES
"10/22  S:  23 y.o.male s/p Lpa Salomón Patch  POD# 2.  Patient doing well, going for UGI with contrast this morning.  Pain controlled, ambulating, no nausea or emesis.      O:  Blood pressure 123/66, pulse 74, temperature 36.7 °C (98 °F), resp. rate 17, height 1.727 m (5' 8\"), weight 70.5 kg (155 lb 6.8 oz), SpO2 97 %.  I/O last 3 completed shifts:  In: 1250 [P.O.:100; I.V.:950]  Out: 2885 [Drains:85]  Recent Labs      10/19/18   2005  10/22/18   0054   SODIUM  139  140   POTASSIUM  3.7  3.2*   CHLORIDE  106  104   CO2  24  25   GLUCOSE  122*  61*   BUN  17  13   CREATININE  0.77  0.67   CALCIUM  9.9  9.4     Recent Labs      10/19/18   2005  10/22/18   0054   WBC  11.0*  11.0*   RBC  5.76  5.33   HEMOGLOBIN  16.5  15.4   HEMATOCRIT  49.1  45.5   MCV  85.2  85.4   MCH  28.6  28.9   MCHC  33.6*  33.8   RDW  39.1  38.7   PLATELETCT  247  184   MPV  9.9  10.1       Alert and Oriented x3, No Acute Distress  Normal Respiratory Effort  Abdomen soft, appropriately tender  Incisions/Bandages clean/dry/intact  Extremities warm and well perfused  ARLYN Output Serosanguinous- 25cc    A/P:  Pain control with PO meds, IV prn  Diet NPO, pending imaging results may allow PO later today  Fluids continue, replace K  Ambulate tid and ad noah  Follow up UGI results today    "

## 2018-10-23 VITALS
HEART RATE: 65 BPM | TEMPERATURE: 98.3 F | WEIGHT: 155.42 LBS | RESPIRATION RATE: 17 BRPM | OXYGEN SATURATION: 98 % | SYSTOLIC BLOOD PRESSURE: 108 MMHG | HEIGHT: 68 IN | BODY MASS INDEX: 23.56 KG/M2 | DIASTOLIC BLOOD PRESSURE: 64 MMHG

## 2018-10-23 LAB
ANION GAP SERPL CALC-SCNC: 8 MMOL/L (ref 0–11.9)
BUN SERPL-MCNC: 7 MG/DL (ref 8–22)
CALCIUM SERPL-MCNC: 9.5 MG/DL (ref 8.5–10.5)
CHLORIDE SERPL-SCNC: 106 MMOL/L (ref 96–112)
CO2 SERPL-SCNC: 25 MMOL/L (ref 20–33)
CREAT SERPL-MCNC: 0.58 MG/DL (ref 0.5–1.4)
ERYTHROCYTE [DISTWIDTH] IN BLOOD BY AUTOMATED COUNT: 38.6 FL (ref 35.9–50)
GLUCOSE SERPL-MCNC: 92 MG/DL (ref 65–99)
HCT VFR BLD AUTO: 44.2 % (ref 42–52)
HGB BLD-MCNC: 15.1 G/DL (ref 14–18)
MAGNESIUM SERPL-MCNC: 1.7 MG/DL (ref 1.5–2.5)
MCH RBC QN AUTO: 29.1 PG (ref 27–33)
MCHC RBC AUTO-ENTMCNC: 34.2 G/DL (ref 33.7–35.3)
MCV RBC AUTO: 85.2 FL (ref 81.4–97.8)
PLATELET # BLD AUTO: 185 K/UL (ref 164–446)
PMV BLD AUTO: 9.6 FL (ref 9–12.9)
POTASSIUM SERPL-SCNC: 3.6 MMOL/L (ref 3.6–5.5)
RBC # BLD AUTO: 5.19 M/UL (ref 4.7–6.1)
SODIUM SERPL-SCNC: 139 MMOL/L (ref 135–145)
WBC # BLD AUTO: 7.9 K/UL (ref 4.8–10.8)

## 2018-10-23 PROCEDURE — A9270 NON-COVERED ITEM OR SERVICE: HCPCS | Performed by: SURGERY

## 2018-10-23 PROCEDURE — 85027 COMPLETE CBC AUTOMATED: CPT

## 2018-10-23 PROCEDURE — 700105 HCHG RX REV CODE 258: Performed by: SURGERY

## 2018-10-23 PROCEDURE — 700102 HCHG RX REV CODE 250 W/ 637 OVERRIDE(OP): Performed by: SURGERY

## 2018-10-23 PROCEDURE — G0378 HOSPITAL OBSERVATION PER HR: HCPCS

## 2018-10-23 PROCEDURE — 96366 THER/PROPH/DIAG IV INF ADDON: CPT

## 2018-10-23 PROCEDURE — 83735 ASSAY OF MAGNESIUM: CPT

## 2018-10-23 PROCEDURE — 80048 BASIC METABOLIC PNL TOTAL CA: CPT

## 2018-10-23 PROCEDURE — 700111 HCHG RX REV CODE 636 W/ 250 OVERRIDE (IP): Performed by: SURGERY

## 2018-10-23 PROCEDURE — 36415 COLL VENOUS BLD VENIPUNCTURE: CPT

## 2018-10-23 RX ORDER — AMOXICILLIN AND CLAVULANATE POTASSIUM 875; 125 MG/1; MG/1
1 TABLET, FILM COATED ORAL 2 TIMES DAILY
Status: DISCONTINUED | OUTPATIENT
Start: 2018-10-23 | End: 2018-10-23 | Stop reason: HOSPADM

## 2018-10-23 RX ORDER — AMOXICILLIN AND CLAVULANATE POTASSIUM 875; 125 MG/1; MG/1
1 TABLET, FILM COATED ORAL 2 TIMES DAILY
Qty: 14 TAB | Refills: 0 | Status: SHIPPED | OUTPATIENT
Start: 2018-10-23

## 2018-10-23 RX ORDER — OXYCODONE HYDROCHLORIDE AND ACETAMINOPHEN 5; 325 MG/1; MG/1
1-2 TABLET ORAL EVERY 4 HOURS PRN
Qty: 20 TAB | Refills: 0 | Status: SHIPPED | OUTPATIENT
Start: 2018-10-23 | End: 2018-10-30

## 2018-10-23 RX ORDER — PANTOPRAZOLE SODIUM 40 MG/1
40 TABLET, DELAYED RELEASE ORAL DAILY
Qty: 30 TAB | Refills: 2 | Status: SHIPPED | OUTPATIENT
Start: 2018-10-23 | End: 2018-10-23

## 2018-10-23 RX ORDER — PANTOPRAZOLE SODIUM 40 MG/1
40 TABLET, DELAYED RELEASE ORAL DAILY
Qty: 30 TAB | Refills: 2 | Status: SHIPPED | OUTPATIENT
Start: 2018-10-23

## 2018-10-23 RX ADMIN — PIPERACILLIN SODIUM AND TAZOBACTAM SODIUM 3.38 G: 3; .375 INJECTION, POWDER, FOR SOLUTION INTRAVENOUS at 06:05

## 2018-10-23 RX ADMIN — AMOXICILLIN AND CLAVULANATE POTASSIUM 1 TABLET: 875; 125 TABLET, FILM COATED ORAL at 09:52

## 2018-10-23 RX ADMIN — OMEPRAZOLE 20 MG: 20 CAPSULE, DELAYED RELEASE ORAL at 06:05

## 2018-10-23 ASSESSMENT — PAIN SCALES - GENERAL: PAINLEVEL_OUTOF10: 0

## 2018-10-23 NOTE — PROGRESS NOTES
Assumed care at 0700. Received report from night shift RN. Bedside report completed. AOx4.    Denies pain.  Denies nausea.  Tolerating full liquid diet. +voiding. +BM  IVF infusing.   RLQ ARLYN in place with moderate drainage  Ambulating with steady gait. Pt call light and belongings within reach, fall precautions in place. Family at bedside.

## 2018-10-23 NOTE — DISCHARGE INSTRUCTIONS
Discharge Instructions    Discharged to home by car with relative. Discharged via wheelchair, hospital escort: Yes.  Special equipment needed: Not Applicable    Be sure to schedule a follow-up appointment with your primary care doctor or any specialists as instructed.     Discharge Plan:   Smoking Cessation Offered: Patient Refused  Influenza Vaccine Indication: Indicated: 9 to 64 years of age  Influenza Vaccine Given - only chart on this line when given: Influenza Vaccine Given (See MAR)    I understand that a diet low in cholesterol, fat, and sodium is recommended for good health. Unless I have been given specific instructions below for another diet, I accept this instruction as my diet prescription.   Other diet: Soft Low Fiber diet as tolerated    Special Instructions:   Monitor for signs and symptoms of infection (fever, chills, nausea, vomiting)  Monitor incisions for swelling, redness, or drainage  Drain to remain to bulb suction- empty and record output daily   May shower with drain in place   No heavy lifting for 4 weeks, call Dr. Kwong's office if you need a note for work    Bulb Drain Home Care  A bulb drain consists of a thin rubber tube and a soft, round bulb that creates a gentle suction. The rubber tube is placed in the area where you had surgery. A bulb is attached to the end of the tube that is outside the body. The bulb drain removes excess fluid that normally builds up in a surgical wound after surgery. The color and amount of fluid will vary. Immediately after surgery, the fluid is bright red and is a little thicker than water. It may gradually change to a yellow or pink color and become more thin and water-like. When the amount decreases to about 1 or 2 tbsp in 24 hours, your health care provider will usually remove it.  DAILY CARE  · Keep the bulb flat (compressed) at all times, except while emptying it. The flatness creates suction. You can flatten the bulb by squeezing it firmly in the  middle and then closing the cap.  · Keep sites where the tube enters the skin dry and covered with a bandage (dressing).  · Secure the tube 1-2 in (2.5-5.1 cm) below the insertion sites to keep it from pulling on your stitches. The tube is stitched in place and will not slip out.  · Secure the bulb as directed by your health care provider.  · For the first 3 days after surgery, there usually is more fluid in the bulb. Empty the bulb whenever it becomes half full because the bulb does not create enough suction if it is too full. The bulb could also overflow. Write down how much fluid you remove each time you empty your drain. Add up the amount removed in 24 hours.  · Empty the bulb at the same time every day once the amount of fluid decreases and you only need to empty it once a day. Write down the amounts and the 24-hour totals to give to your health care provider. This helps your health care provider know when the tubes can be removed.  EMPTYING THE BULB DRAIN  Before emptying the bulb, get a measuring cup, a piece of paper and a pen, and wash your hands.  · Gently run your fingers down the tube (stripping) to empty any drainage from the tubing into the bulb. This may need to be done several times a day to clear the tubing of clots and tissue.  · Open the bulb cap to release suction, which causes it to inflate. Do not touch the inside of the cap.  · Gently run your fingers down the tube (stripping) to empty any drainage from the tubing into the bulb.  · Hold the cap out of the way, and pour fluid into the measuring cup.    · Squeeze the bulb to provide suction.   · Replace the cap.    · Check the tape that holds the tube to your skin. If it is becoming loose, you can remove the loose piece of tape and apply a new one. Then, pin the bulb to your shirt.    · Write down the amount of fluid you emptied out. Write down the date and each time you emptied your bulb drain. (If there are 2 bulbs, note the amount of drainage  from each bulb and keep the totals separate. Your health care provider will want to know the total amounts for each drain and which tube is draining more.)    · Flush the fluid down the toilet and wash your hands.    · Call your health care provider once you have less than 2 tbsp of fluid collecting in the bulb drain every 24 hours.  If there is drainage around the tube site, change dressings and keep the area dry. Cleanse around tube with sterile saline and place dry gauze around site. This gauze should be changed when it is soiled. If it stays clean and unsoiled, it should still be changed daily.   SEEK MEDICAL CARE IF:  · Your drainage has a bad smell or is cloudy.    · You have a fever.    · Your drainage is increasing instead of decreasing.    · Your tube fell out.    · You have redness or swelling around the tube site.    · You have drainage from a surgical wound.    · Your bulb drain will not stay flat after you empty it.    MAKE SURE YOU:   · Understand these instructions.  · Will watch your condition.  · Will get help right away if you are not doing well or get worse.     This information is not intended to replace advice given to you by your health care provider. Make sure you discuss any questions you have with your health care provider.     Document Released: 12/15/2001 Document Revised: 01/08/2016 Document Reviewed: 05/22/2013  [employer] Interactive Patient Education ©2016 [employer]      Peptic Ulcer  A peptic ulcer is a sore in the lining of the esophagus (esophageal ulcer), the stomach (gastric ulcer), or the first part of the small intestine (duodenal ulcer). The ulcer causes gradual wearing away (erosion) into the deeper tissue.  What are the causes?  Normally, the lining of the stomach and the small intestine protects itself from the acid that digests food. The protective lining can be damaged by:  · An infection caused by a germ (bacterium) called Helicobacter pylori or H. pylori.  · Regular  use of NSAIDs, such as ibuprofen or aspirin.  · Rare tumors in the stomach, small intestine, or pancreas (Zollinger-Abad syndrome).  What increases the risk?  The following factors may make you more likely to develop this condition:  · Smoking.  · Having a family history of ulcer disease.  What are the signs or symptoms?  Symptoms of this condition include:  · Burning pain or gnawing in the area between the chest and the belly button. The pain may be worse on an empty stomach and at night.  · Heartburn.  · Nausea and vomiting.  · Bloating.  If the ulcer results in bleeding, it can cause:  · Black, tarry stools.  · Vomiting of bright red blood.  · Vomiting of material that looks like coffee grounds.  How is this diagnosed?  This condition may be diagnosed based on:  · Medical history and physical exam.  · Various tests or procedures, such as:  ¨ Blood tests, stool tests, or breath tests to check for the H. pylori bacterium.  ¨ An X-ray exam (upper gastrointestinal series) of the esophagus, stomach, and small intestine.  ¨ Upper endoscopy. The health care provider examines the esophagus, stomach, and small intestine using a small flexible tube that has a video camera at the end.  ¨ Biopsy. A tissue sample is removed to be examined under a microscope.  How is this treated?  Treatment for this condition may include:  · Eliminating the cause of the ulcer, such as smoking or the use of NSAIDs or alcohol.  · Medicines to reduce the amount of acid in your digestive tract.  · Antibiotic medicines, if the ulcer is caused by the H. pylori bacterium.  · An upper endoscopy to treat a bleeding ulcer.  · Surgery, if the bleeding is severe or if the ulcer created a hole somewhere in the digestive system.  Follow these instructions at home:  · Avoid alcohol and caffeine.  · Do not use any tobacco products, such as cigarettes, chewing tobacco, and e-cigarettes. If you need help quitting, ask your health care provider.  · Take  over-the-counter and prescription medicines only as told by your health care provider. Do not use over-the-counter medicines in place of prescription medicines unless your health care provider approves.  · Keep all follow-up visits as told by your health care provider. This is important.  Contact a health care provider if:  · Your symptoms do not improve within 7 days of starting treatment.  · You have ongoing indigestion or heartburn.  Get help right away if:  · You have sudden, sharp, or persistent pain in your abdomen.  · You have bloody or dark black, tarry stools.  · You vomit blood or material that looks like coffee grounds.  · You become light-headed or you feel faint.  · You become weak.  · You become sweaty or clammy.  This information is not intended to replace advice given to you by your health care provider. Make sure you discuss any questions you have with your health care provider.  Document Released: 12/15/2001 Document Revised: 05/22/2017 Document Reviewed: 09/17/2016  [employer] Interactive Patient Education © 2017 [employer]      Depression / Suicide Risk    As you are discharged from this [facility] facility, it is important to learn how to keep safe from harming yourself.    Recognize the warning signs:  · Abrupt changes in personality, positive or negative- including increase in energy   · Giving away possessions  · Change in eating patterns- significant weight changes-  positive or negative  · Change in sleeping patterns- unable to sleep or sleeping all the time   · Unwillingness or inability to communicate  · Depression  · Unusual sadness, discouragement and loneliness  · Talk of wanting to die  · Neglect of personal appearance   · Rebelliousness- reckless behavior  · Withdrawal from people/activities they love  · Confusion- inability to concentrate     If you or a loved one observes any of these behaviors or has concerns about self-harm, here's what you can do:  · Talk about it- your  feelings and reasons for harming yourself  · Remove any means that you might use to hurt yourself (examples: pills, rope, extension cords, firearm)  · Get professional help from the community (Mental Health, Substance Abuse, psychological counseling)  · Do not be alone:Call your Safe Contact- someone whom you trust who will be there for you.  · Call your local CRISIS HOTLINE 663-3152 or 533-827-1464  · Call your local Children's Mobile Crisis Response Team [place] (034) 594-0316 or www.[employer]  · Call the toll free National Suicide Prevention Hotlines   · National Suicide Prevention Lifeline 967-617-DMZL (3801)  · National Hope Line Network 800-SUICIDE (298-9824)

## 2018-10-23 NOTE — CARE PLAN
Problem: Safety  Goal: Will remain free from injury  Outcome: PROGRESSING AS EXPECTED  Proper fall precautions in place. Call light within reach and encouraged to use. Hourly rounding in practice.     Problem: Bowel/Gastric:  Goal: Will not experience complications related to bowel motility  Outcome: PROGRESSING AS EXPECTED  Pts NGT out and tolerating full liquid diet. Denies N/V at this time.

## 2018-10-23 NOTE — PROGRESS NOTES
Assumed care of pt at 1900, report given.  A+O x 4, VSS, and RA.   Pt has 3 abdominal lap sites all dermabonded and NIECY; no drainage.   Pt has RQ ARLYN drain with bulb suction and moderate serosanguinous output. Dressing is CDI with no drainage.   Pt tolerating full liquid diet as of lunch and dinner. Denies N/V at this time.   +void  +BM  Pt ambulating up to bathroom and down halls this evening by self. Tolerating well.   Pt updated on POC and all questions answered at this time.  Bed in lowest position, call light within reach, and no current needs.

## 2018-10-23 NOTE — PROGRESS NOTES
Discharging Patient home per physician order.  Discharged with Family.  Demonstrated understanding of discharge instructions, follow up appointments, home medications, prescriptions, home care for surgical wound and nursing care instructions for ARLYN drain.  Pt able to demonstrate ability to care for ARLYN drain.  Ambulating without assistance, voiding without difficulty, pain well controlled, tolerating oral medications, oxygen saturation greater than 90%, tolerating diet.  Educational handouts given and discussed.  Verbalized understanding of discharge instructions and educational handouts.  All questions answered.  Belongings with patient at time of discharge.

## 2018-10-23 NOTE — PROGRESS NOTES
"10/23  S:  23 y.o.male s/p Lap Salomón Patch Repair Ulcer  POD# 3.  Patient doing well, ambulating, tolerating full liquids PO, pain controlled, wants to advance diet and go home today.  Denies any emesis with PO, having flatus and BM.      O:  Blood pressure 115/71, pulse 66, temperature 36.8 °C (98.2 °F), resp. rate 16, height 1.727 m (5' 8\"), weight 70.5 kg (155 lb 6.8 oz), SpO2 97 %.  I/O last 3 completed shifts:  In: 1750 [P.O.:500; I.V.:750]  Out: 2005 [Drains:255]  Recent Labs      10/22/18   0054  10/23/18   0327   SODIUM  140  139   POTASSIUM  3.2*  3.6   CHLORIDE  104  106   CO2  25  25   GLUCOSE  61*  92   BUN  13  7*   CREATININE  0.67  0.58   CALCIUM  9.4  9.5     Recent Labs      10/22/18   0054  10/23/18   0327   WBC  11.0*  7.9   RBC  5.33  5.19   HEMOGLOBIN  15.4  15.1   HEMATOCRIT  45.5  44.2   MCV  85.4  85.2   MCH  28.9  29.1   MCHC  33.8  34.2   RDW  38.7  38.6   PLATELETCT  184  185   MPV  10.1  9.6       Alert and Oriented x3, No Acute Distress  Normal Respiratory Effort  Abdomen soft, appropriately tender  Incisions/Bandages clean/dry/intact  Extremities warm and well perfused  ARLYN Output Serosanguinous- 250cc yesterday, serous only, very positional  UGI- no leak or obstruction    A/P:  Pain control with PO meds  Diet as tolerated- GI soft only  Fluids SLIV  Ambulate tid and ad noah  Continue PPI PO for next couple months  Follow up with GI outpatient for EGD  Follow up with Dr Kwong in 1 week      "

## 2018-11-02 NOTE — DISCHARGE SUMMARY
Discharge Summary      DATE OF ADMISSION: 10/19/2018    DATE OF DISCHARGE: 10/23/18    ADMISSION DIAGNOSIS (ES):  ? Perforated Appendicitis    DISCHARGE DIAGNOSIS (ES):  ? Perforated Gastric Ulcer    DISCHARGE CONDITION:  ? stable    CONSULTATIONS:  ? none    PROCEDURES:  ? Laparoscopic Salomón Patch repair of peforated ulcer    BRIEF HPI:  ? 23y M presented with acute onset abdominal pain,  Evidence on CT of possible appendicitis, pt was admitted for further care and taken to the OR.      HOSPITAL COURSE:  ? After undergoing the above operation, during which a perforated ulcer was identified and repaired laparoscopically, pt was returned to the floor for recovery.  He was initially kept NPO with NGT in place and started on IV PPI.  He had an upper GI on POD 2 which did not show any evidence of ongoing leak or perforation.  ? Pt had NGT removed and diet advanced.  He did well with this and was discharged home on a soft diet with oral PPI.  ARLYN drain was left in place and was putting out serous fluid only at time of discharge home.  His wounds were healing well.      MEDS:   Current Outpatient Prescriptions   Medication Sig Dispense Refill   • amoxicillin-clavulanate (AUGMENTIN) 875-125 MG Tab Take 1 Tab by mouth 2 times a day. 14 Tab 0   • pantoprazole (PROTONIX) 40 MG Tablet Delayed Response Take 1 Tab by mouth every day. 30 Tab 2       FOLLOW-UP:  ? Please call my office at [phone] to make an appointment in 1 weeks    DISCHARGE INSTRUCTIONS:

## 2022-09-26 ENCOUNTER — HOSPITAL ENCOUNTER (EMERGENCY)
Facility: MEDICAL CENTER | Age: 27
End: 2022-09-26
Attending: EMERGENCY MEDICINE
Payer: COMMERCIAL

## 2022-09-26 ENCOUNTER — APPOINTMENT (OUTPATIENT)
Dept: RADIOLOGY | Facility: MEDICAL CENTER | Age: 27
End: 2022-09-26
Attending: EMERGENCY MEDICINE
Payer: COMMERCIAL

## 2022-09-26 VITALS
HEART RATE: 64 BPM | HEIGHT: 67 IN | DIASTOLIC BLOOD PRESSURE: 81 MMHG | OXYGEN SATURATION: 98 % | SYSTOLIC BLOOD PRESSURE: 137 MMHG | RESPIRATION RATE: 16 BRPM | WEIGHT: 158.73 LBS | TEMPERATURE: 97.8 F | BODY MASS INDEX: 24.91 KG/M2

## 2022-09-26 DIAGNOSIS — S06.0X9A CONCUSSION WITH LOSS OF CONSCIOUSNESS, INITIAL ENCOUNTER: ICD-10-CM
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
## 2022-09-26 DIAGNOSIS — R51.9 ACUTE NONINTRACTABLE HEADACHE, UNSPECIFIED HEADACHE TYPE: ICD-10-CM

## 2022-09-26 PROCEDURE — 99284 EMERGENCY DEPT VISIT MOD MDM: CPT

## 2022-09-26 PROCEDURE — 70450 CT HEAD/BRAIN W/O DYE: CPT

## 2022-09-26 ASSESSMENT — ENCOUNTER SYMPTOMS
WEAKNESS: 0
SPEECH CHANGE: 0
TINGLING: 1
DIZZINESS: 1

## 2022-09-27 NOTE — ED NOTES
Discharge instructions provided to pt.  ERP went over discharge instructions with pt.  Pt verbalized understanding.  Instructed to return to Emergency Department for new or worsening symptoms.
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
## (undated) DEVICE — BAG RETRIEVAL 10ML (10EA/BX)

## (undated) DEVICE — SET EXTENSION WITH 2 PORTS (48EA/CA) ***PART #2C8610 IS A SUBSTITUTE*****

## (undated) DEVICE — SUTURE 2-0 ETHILON FS - (36/BX) 18 INCH

## (undated) DEVICE — DRAPESURG STERI-DRAPE LONG - (10/BX 4BX/CA)

## (undated) DEVICE — CANNULA W/SEAL 5X100 Z-THRE - ADED KII (12/BX)

## (undated) DEVICE — DERMABOND ADVANCED - (12EA/BX)

## (undated) DEVICE — GLOVE BIOGEL INDICATOR SZ 7SURGICAL PF LTX - (50/BX 4BX/CA)

## (undated) DEVICE — NEPTUNE 4 PORT MANIFOLD - (20/PK)

## (undated) DEVICE — SLEEVE, VASO, THIGH, MED

## (undated) DEVICE — LACTATED RINGERS INJ 1000 ML - (14EA/CA 60CA/PF)

## (undated) DEVICE — SUTURE 4-0 MONOCRYL PLUS PS-2 - 27 INCH (36/BX)

## (undated) DEVICE — ELECTRODE 850 FOAM ADHESIVE - HYDROGEL RADIOTRNSPRNT (50/PK)

## (undated) DEVICE — SUTURE 2-0 VICRYL PLUS SH - 27 INCH (36/BX)

## (undated) DEVICE — SET LEADWIRE 5 LEAD BEDSIDE DISPOSABLE ECG (1SET OF 5/EA)

## (undated) DEVICE — CANISTER SUCTION 3000ML MECHANICAL FILTER AUTO SHUTOFF MEDI-VAC NONSTERILE LF DISP  (40EA/CA)

## (undated) DEVICE — TUBING INSUFFLATION - (10/BX)

## (undated) DEVICE — SENSOR SPO2 NEO LNCS ADHESIVE (20/BX) SEE USER NOTES

## (undated) DEVICE — SUTURE GENERAL

## (undated) DEVICE — KIT ROOM DECONTAMINATION

## (undated) DEVICE — TUBING CLEARLINK DUO-VENT - C-FLO (48EA/CA)

## (undated) DEVICE — PACK LAP CHOLE OR - (2EA/CA)

## (undated) DEVICE — BLADE SURGICAL #11 - (50/BX)

## (undated) DEVICE — STAPLER 45MM ARTICULATING - ENDO (3EA/BX)

## (undated) DEVICE — KIT ANESTHESIA W/CIRCUIT & 3/LT BAG W/FILTER (20EA/CA)

## (undated) DEVICE — GLOVE BIOGEL SZ 7 SURGICAL PF LTX - (50PR/BX 4BX/CA)

## (undated) DEVICE — DETERGENT RENUZYME PLUS 10 OZ PACKET (50/BX)

## (undated) DEVICE — MASK ANESTHESIA ADULT  - (100/CA)

## (undated) DEVICE — GOWN WARMING STANDARD FLEX - (30/CA)

## (undated) DEVICE — SODIUM CHL IRRIGATION 0.9% 1000ML (12EA/CA)

## (undated) DEVICE — SUCTION INSTRUMENT YANKAUER BULBOUS TIP W/O VENT (50EA/CA)

## (undated) DEVICE — SUTURE 0 PDS CT-2 (36PK/BX)

## (undated) DEVICE — CHLORAPREP 26 ML APPLICATOR - ORANGE TINT(25/CA)

## (undated) DEVICE — ELECTRODE DUAL RETURN W/ CORD - (50/PK)

## (undated) DEVICE — TROCAR Z THREAD12MM OPTICAL - NON BLADED (6/BX)

## (undated) DEVICE — TROCAR 5X100 NON BLADED Z-TH - READ KII (6/BX)

## (undated) DEVICE — WATER IRRIG. STER. 1500 ML - (9/CA)

## (undated) DEVICE — HEAD HOLDER JUNIOR/ADULT

## (undated) DEVICE — PROTECTOR ULNA NERVE - (36PR/CA)

## (undated) DEVICE — RESERVOIR SUCTION 100 CC - SILICONE (20EA/CA)